# Patient Record
Sex: MALE | Race: WHITE | NOT HISPANIC OR LATINO | Employment: FULL TIME | URBAN - METROPOLITAN AREA
[De-identification: names, ages, dates, MRNs, and addresses within clinical notes are randomized per-mention and may not be internally consistent; named-entity substitution may affect disease eponyms.]

---
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
## 2024-02-14 ENCOUNTER — TELEPHONE (OUTPATIENT)
Dept: NEUROLOGY | Facility: CLINIC | Age: 19
End: 2024-02-14
Payer: COMMERCIAL

## 2024-02-14 NOTE — TELEPHONE ENCOUNTER
----- Message from Darren Marks sent at 2/14/2024  2:18 PM CST -----  Regarding: Patient advice  Contact: Pt mom  Pt mom called in regards to getting an medication called into Pharmacy (Azstarys 26.1mg)      [facility]  [address] [place] [address]  [phone]      Pt mom can be reached at [phone]

## 2024-03-13 ENCOUNTER — TELEPHONE (OUTPATIENT)
Dept: NEUROLOGY | Facility: CLINIC | Age: 19
End: 2024-03-13
Payer: COMMERCIAL

## 2024-03-13 NOTE — TELEPHONE ENCOUNTER
----- Message from Nevin Navarro sent at 3/13/2024 12:20 PM CDT -----  Type:  Patient Returning Call    Who Called: Pt  Who Left Message for Patient:  Does the patient know what this is regarding?: medication  Would the patient rather a call back or a response via [facility]? Call  Best Call Back Number: [phone]  Additional Information: Mother would like to speak to nurse related to pt medication